# Patient Record
Sex: FEMALE | Race: WHITE | NOT HISPANIC OR LATINO | Employment: FULL TIME | URBAN - METROPOLITAN AREA
[De-identification: names, ages, dates, MRNs, and addresses within clinical notes are randomized per-mention and may not be internally consistent; named-entity substitution may affect disease eponyms.]

---

## 2017-01-05 ENCOUNTER — VIRTUAL VISIT (OUTPATIENT)
Dept: FAMILY MEDICINE | Facility: OTHER | Age: 37
End: 2017-01-05

## 2017-01-05 NOTE — PROGRESS NOTES
"Date:   Clinician: Criss Beach  Clinician NPI: [id number]  Patient: Diana Orlando  Patient : 1980  Patient Address: [address] [place] [address] [place] [address]  Patient Phone: [phone]  Visit Protocol: Yeast Infection  Patient Summary:  Diana is a 36 year old ( : 1980 ) female who initiated a Zip for a presumed vaginal yeast infection. When asked the question \"Do you have a [place] primary care physician?\", Diana responded \"No\".    0-5 days ago she began noticing vaginal discharge, vaginal pruritus, and perivulvar pruritus .   She denies having perivulvar rash, fever, and abdominal pain or lesions.  She has had two (2) occurrences in the past year and the current symptoms are similar to previous yeast infections. She has not tried to treat her current symptoms with any medication.   She has a more than normal amount of chunky (like cottage cheese), clear or white, thick, non-odorous discharge.   She is currently taking or has taken antibiotics in the past 2 weeks. She prefers a fluconazole (Diflucan) Pill.   She states she is not pregnant and denies breastfeeding. She has menstruated in the past month.   She denies risk factors for sexually transmitted infections. She does NOT smoke or use smokeless tobacco.    MEDICATIONS:  No current medications   , ALLERGIES:  NKDA   Clinician Response:  Dear Diana,  Based on the information you have provided, you likely have a vaginal yeast infection which is a common infection of the vagina caused by a fungus.   I am prescribing:   Fluconazole (Diflucan) 150 mg oral tablet to treat your yeast infection. Swallow one (1) tablet as a single dose. There are no refills with this prescription.   While you have yeast infection symptoms, do the following:      Avoid irritants such as scented bath products, tampons, pads, or vaginal sprays and powders.    Avoid douching.    Wear cotton underwear and if you are comfortable doing so, do not wear underwear to bed. "    Avoid hot tubs and whirlpool spas.     Most women notice improvement in their symptoms within 1-2 days after starting treatment with complete clearing in 5-7 days. If your symptoms have not improved in 3 days or not resolved in 10 days, please schedule an appointment to see your primary care clinician for an evaluation as your symptoms may be caused by an infection other than yeast. Sometimes yeast infections can be associated with other vaginal infections or with sexually transmitted infections (STIs). If you think you may be at risk for a STI please be seen in a clinic.   Diagnosis: Candida Vulvovaginitis  Diagnosis ICD: B37.3  Prescription: fluconazole (Diflucan) 150mg oral tablet 1 tablet, 1 days supply. Take one tablet by mouth one time a day for 1 day. Refills: 0, Refill as needed: no, Allow substitutions: yes  Prescription Sent At: January 05 06:47:55, 2017  Pharmacy: [id number] - [phone] - [address] [place] [address]

## 2017-02-02 ENCOUNTER — VIRTUAL VISIT (OUTPATIENT)
Dept: FAMILY MEDICINE | Facility: OTHER | Age: 37
End: 2017-02-02

## 2017-02-03 NOTE — PROGRESS NOTES
"Date:   Clinician: Po Aguirre  Clinician NPI: [id number]  Patient: Diana Orlando  Patient : 1980  Patient Address: [address] [place] [address] [place] [address]  Patient Phone: [phone]  Visit Protocol: Yeast Infection  Patient Summary:  Diana is a 36 year old ( : 1980 ) female who initiated a Zip for a presumed vaginal yeast infection. When asked the question \"Do you have a [place] primary care physician?\", Diana responded \"No\".    0-5 days ago she began noticing vaginal discharge and vaginal pruritus .   She denies having perivulvar pruritus, perivulvar rash, fever, and abdominal pain or lesions.  She has had two (2) occurrences in the past year and the current symptoms are similar to previous yeast infections. She has not tried to treat her current symptoms with any medication.   She has a more than normal amount of chunky (like cottage cheese), clear or white, thick, non-odorous discharge.   She is currently taking or has taken antibiotics in the past 2 weeks. She prefers a fluconazole (Diflucan) Pill.   She states she is not pregnant and denies breastfeeding. She has menstruated in the past month.   She denies risk factors for sexually transmitted infections. She does NOT smoke or use smokeless tobacco.    MEDICATIONS:  No current medications   , ALLERGIES:  NKDA   Clinician Response:  Dear Diana,  Based on the information you have provided, you likely have a vaginal yeast infection which is a common infection of the vagina caused by a fungus.   I am prescribing:   Fluconazole (Diflucan) 150 mg oral tablet to treat your yeast infection. Swallow one (1) tablet as a single dose. There are no refills with this prescription.   While you have yeast infection symptoms, do the following:      Avoid irritants such as scented bath products, tampons, pads, or vaginal sprays and powders.    Avoid douching.    Wear cotton underwear and if you are comfortable doing so, do not wear underwear to bed. "    Avoid hot tubs and whirlpool spas.     Most women notice improvement in their symptoms within 1-2 days after starting treatment with complete clearing in 5-7 days. If your symptoms have not improved in 3 days or not resolved in 10 days, please schedule an appointment to see your primary care clinician for an evaluation as your symptoms may be caused by an infection other than yeast. Sometimes yeast infections can be associated with other vaginal infections or with sexually transmitted infections (STIs). If you think you may be at risk for a STI please be seen in a clinic.   Diagnosis: Candida Vulvovaginitis  Diagnosis ICD: B37.3  Prescription: fluconazole (Diflucan) 150mg oral tablet 1 tablet, 1 days supply. Take one tablet by mouth one time a day for 1 day. Refills: 0, Refill as needed: no, Allow substitutions: yes  Prescription Sent At: February 02 20:32:37, 2017  Pharmacy: [facility] [id number] - [phone] - [address], [place] [address]

## 2018-09-15 ENCOUNTER — VIRTUAL VISIT (OUTPATIENT)
Dept: FAMILY MEDICINE | Facility: OTHER | Age: 38
End: 2018-09-15

## 2018-09-17 NOTE — PROGRESS NOTES
"Date:   Clinician: Anthony John  Clinician NPI: [id number]  Patient: Diana Herbert  Patient : 1980  Patient Address: [address], [place] [address]  Patient Phone: [phone]  Visit Protocol: Ingrown toenail  Patient Summary:  Diana is a 37 year old ( : 1980 ) female who initiated a Visit for evaluation of Ingrown Toenail. When asked the question \"Please sign me up to receive news, health information and promotions from [employer].\", Diana responded \"No\".    Diana uploaded images of her condition.   Her big toe on her right foot is affected. Her symptoms started 1 to 7 days ago. Diana has warmth, swelling, pain, and redness on the affected toe. Diana is able to walk without limping. She is unable to put her shoes on. She claims that her symptoms started as a result of an injury.    Symptom Details     Redness: The redness has not spread since it started. There are not red lines or streaks going up her leg from the affected toe.     Pain: Diana describes the pain as mild (nagging or annoying pain, interfering little with normal daily activities). She experiences pain only when pressure is applied to the affected toe.     She also denies discharge, a deformed or crooked nail, growth of new skin tissue, and open sores on or near the affected toe. Diana has not had similar symptoms on the same toe before. She does not have a circulation problem and has not had nerve damage in her foot. Diana does not feel feverish.   Diana tried soaking the affected foot and using antibiotic ointment to treat her condition. The foot soak was somewhat effective. The antibiotic ointment was somewhat effective.   She denies pregnancy and denies breastfeeding. She does not menstruate.   Weight: 126 lbs   Diana does not smoke or use smokeless tobacco.   Additional information as reported by the patient (free text): I had a pedicure 3 days ago and she cut my ingrown toenail. It didn?t hurt that day but yesterday and " today it?s getting more sore and red.     MEDICATIONS: No current medications, ALLERGIES: NKDA  Clinician Response:  Dear Diana,   Based on the information you provided, you likely have an Ingrown Toenail.  Unless you are allergic to the over-the-counter medication(s) below, I recommend using:  Ibuprofen (Advil or store brand) 200 mg oral tablet. Take 1-3 200mg tablets (200-600mg) by mouth every 8 hours to help with discomfort. Make sure to take the ibuprofen with food. Do not exceed 2400mg in 24 hours. This is an over-the-counter medication that does not require a prescription.  Please take the following precautions to help reduce your symptoms:     Do not wear shoes that are too tight around your toes.    Inappropriate toenail trimming is associated with the formation of ingrown toenails. Cut your toenails either straight across or with a gentle curve, taking care not to cut too deeply down into the corners of the nail. Use a nail file to prevent jagged edges and create a smooth, rounded edge.     Consult with your primary care provider right away if you experience any of the following symptoms: spreading redness, red lines or streaks going up your leg, soreness, yellow/green/bloody discharge, or a fever.   Diagnosis: Ingrown toenail  Diagnosis ICD: L60.0  Additional Clinician Notes: Please call to let us know which pharmacy to call oral antibiotic .

## 2019-11-26 ENCOUNTER — COMMUNICATION - HEALTHEAST (OUTPATIENT)
Dept: TELEHEALTH | Facility: CLINIC | Age: 39
End: 2019-11-26

## 2019-11-26 ENCOUNTER — HOSPITAL ENCOUNTER (OUTPATIENT)
Dept: MAMMOGRAPHY | Facility: CLINIC | Age: 39
Discharge: HOME | End: 2019-11-26

## 2019-11-26 DIAGNOSIS — Z12.31 VISIT FOR SCREENING MAMMOGRAM: ICD-10-CM

## 2020-12-09 ENCOUNTER — HOSPITAL ENCOUNTER (OUTPATIENT)
Dept: MAMMOGRAPHY | Facility: CLINIC | Age: 40
Discharge: HOME | End: 2020-12-09

## 2020-12-09 DIAGNOSIS — Z12.31 VISIT FOR SCREENING MAMMOGRAM: ICD-10-CM

## 2021-01-15 ENCOUNTER — HEALTH MAINTENANCE LETTER (OUTPATIENT)
Age: 41
End: 2021-01-15

## 2021-01-27 ENCOUNTER — AMBULATORY - HEALTHEAST (OUTPATIENT)
Dept: NURSING | Facility: CLINIC | Age: 41
End: 2021-01-27

## 2021-02-17 ENCOUNTER — AMBULATORY - HEALTHEAST (OUTPATIENT)
Dept: NURSING | Facility: CLINIC | Age: 41
End: 2021-02-17

## 2021-10-09 ENCOUNTER — HEALTH MAINTENANCE LETTER (OUTPATIENT)
Age: 41
End: 2021-10-09

## 2022-01-03 ENCOUNTER — HOSPITAL ENCOUNTER (OUTPATIENT)
Dept: MAMMOGRAPHY | Facility: CLINIC | Age: 42
Discharge: HOME | End: 2022-01-03
Attending: NURSE PRACTITIONER | Admitting: NURSE PRACTITIONER
Payer: COMMERCIAL

## 2022-01-03 DIAGNOSIS — Z12.31 VISIT FOR SCREENING MAMMOGRAM: ICD-10-CM

## 2022-01-03 PROCEDURE — 77067 SCR MAMMO BI INCL CAD: CPT

## 2022-01-29 ENCOUNTER — HEALTH MAINTENANCE LETTER (OUTPATIENT)
Age: 42
End: 2022-01-29

## 2022-09-11 ENCOUNTER — HEALTH MAINTENANCE LETTER (OUTPATIENT)
Age: 42
End: 2022-09-11

## 2023-02-08 ENCOUNTER — ANCILLARY PROCEDURE (OUTPATIENT)
Dept: MAMMOGRAPHY | Facility: CLINIC | Age: 43
End: 2023-02-08
Attending: NURSE PRACTITIONER
Payer: COMMERCIAL

## 2023-02-08 DIAGNOSIS — Z12.31 VISIT FOR SCREENING MAMMOGRAM: ICD-10-CM

## 2023-02-08 PROCEDURE — 77067 SCR MAMMO BI INCL CAD: CPT

## 2023-05-06 ENCOUNTER — HEALTH MAINTENANCE LETTER (OUTPATIENT)
Age: 43
End: 2023-05-06

## 2024-07-13 ENCOUNTER — HEALTH MAINTENANCE LETTER (OUTPATIENT)
Age: 44
End: 2024-07-13

## 2024-07-18 ENCOUNTER — ANCILLARY PROCEDURE (OUTPATIENT)
Dept: MAMMOGRAPHY | Facility: CLINIC | Age: 44
End: 2024-07-18
Attending: NURSE PRACTITIONER
Payer: COMMERCIAL

## 2024-07-18 DIAGNOSIS — Z12.31 VISIT FOR SCREENING MAMMOGRAM: ICD-10-CM

## 2024-07-18 PROCEDURE — 77063 BREAST TOMOSYNTHESIS BI: CPT
